# Patient Record
Sex: FEMALE | Employment: OTHER | URBAN - METROPOLITAN AREA
[De-identification: names, ages, dates, MRNs, and addresses within clinical notes are randomized per-mention and may not be internally consistent; named-entity substitution may affect disease eponyms.]

---

## 2024-07-12 NOTE — PROGRESS NOTES
2/17/2023 [place] L discharge note:  Dion Tyson is a 62 y.o. female with a history of Alcoholism, Hep-C, Bipolar Disorder, Asthma who presented to [facility] 2/10/23 with alcohol withdrawal after attempting to self-taper down and unwitnessed fall at home. Hospital course complicated by severe ETOH withdrawal and anxiety disorder. And further prolonged by patient declining discharge and filing appeal for discharge.      Acute Toxic Encephalopathy: due to alcohol withdrawal delirium with underlying bipolar disorder. Behavioral medicine followed.   Severe Alcoholism: with abuse, dependence, severe withdrawal. Drinks 24 beers/day, last use ~2/9/23. S/p Phenobarbital taper - completed 2/14/23. Reported ongoing tremors, Propranolol started 2/15/23. Plan for alcohol treatment center on discharge.   Left Wrist Pain: with unwitnessed fall at home prior to admit. Hand-surgery evaluated 2/13/23, XR with OA, no fracture. Supportive care.   Bipolar-1 Disorder: Follows with Dr. Sim (Psychiatrist) Continued home Lamictal, Topamax, Seroquel, Trazodone, Added PRN atarax for breakthrough anxiety.  followed.   Chronic Hepatitis-C: No treatment to date, RUQ U/S 2/12/23: no overt cirrhosis, did have small right hepatic lobe nodule. MRI-Liver 2/12/23 ordered - can be completed outpatient.   Chronic Cervical Spine DDD: on disability due to neuropathy and weakness. CT Cspine 2/10/23 with degenerative changes resulting in severe spinal stenosis at C5-C7. MRI Cervical Spine 2/12/23 ordered - can complete outpatient.   Asthma: Hx, no exacerbation, prn inhaler continued.  Moderate Malnutrition: POA. By ASPEN criteria. RD followed  Code status: Full  DVT Prophylaxis: lovenox.     Current living situation: Home with boyfriend in [facility]  Expected Disposition: Requesting EtOH treatment facility  Estimated discharge date: has been medically ready since 2/14/23, declined discharge on 2/15 and 2/16 due to not wanting to go to treatment  facility arranged by .   Plan for DC 2/26/23 pending medicare appeal       History of Present Complaint:  The patient was referred to us by Referring Provider: ***. this is 64 y.o.  female {Accompanied by:14647}with a past history of {kt past medical history:07635} presenting with {kt mrdica symptoms:19052}    Pain started {pain onset:36679}  Pain is {Better or worse:89278}   Patient history significant for the following red flags: {Red flags:09366}  The pain is described as {Pain description:48126} and is relieved by {pain relief:11040}      Prior Pain Therapies: {Prior pain therapy:88430}    Past surgical history:  {Past surgical history:27038}           Procedures:   *** the patient has had a ***% improvement in pain.    Portions of record reviewed for pertinent issues: active problem list, medication list, allergies, family history, social history, notes from last encounter, encounters, lab results, imaging and other available records.    I have personally reviewed the OARRS report for this patient. This report is scanned into the electronic medical record. I have considered the risks of abuse, dependence, addiction and diversion. It showed: Ultrex on 50 mg and sporadic hydrocodone prior to that Vivitrol  OPIOID RISK ASSESSMENT SCORE ***/26  Opioid agreement: ***  Activities of daily living: ***  Adverse effects: ***  Analgesia: W/O ***/10, W ***/10  {***}  Toxicology screen: ***  Aberrant behavior: ***      Diagnostic studies:  ***      Employment/disability/litigation: {ktlitigation:92727}    Social History:  {KT social history:08912}       Review of Systems       Physical Exam       Assessment  ***           Plan  At least 50% of the visit was involved in the discussion of the options for treatment. We discussed exercises, medication, interventional therapies and surgery. Healthy life style is essential with patient hard work to achieve the wellness. In addition; discussion with the patient  and/or family about any of the diagnostic results, impressions and/or recommended diagnostic studies, prognosis, risks and benefits of treatment options, instructions for treatment and/or follow-up, importance of compliance with chosen treatment options, risk-factor reduction, and patient/family education.         Pool therapy, walking in the pool, at least 3x per week for 30 minutes  *** Smoking cessation  Healthy lifestyle and anti-inflammatory diet in addition to weight control discussed with the patient  Alternative chronic pain therapies was discussed, encouraged and information was handed  Return to Clinic 3 months       *Please note this report has been produced using speech recognition software and may contain errors related to that system including grammar, punctuation and spelling as well as words and phrases that may be inappropriate. If there are questions or concerns, please feel free to contact me to clarify.    Tresa Maxwell MD

## 2024-07-18 ENCOUNTER — APPOINTMENT (OUTPATIENT)
Dept: PAIN MEDICINE | Facility: CLINIC | Age: 64
End: 2024-07-18
Payer: MEDICARE

## 2024-08-28 ENCOUNTER — HOSPITAL ENCOUNTER (OUTPATIENT)
Facility: HOSPITAL | Age: 64
Setting detail: OBSERVATION
Discharge: HOME | End: 2024-08-29
Attending: EMERGENCY MEDICINE | Admitting: INTERNAL MEDICINE
Payer: MEDICARE

## 2024-08-28 ENCOUNTER — APPOINTMENT (OUTPATIENT)
Dept: RADIOLOGY | Facility: HOSPITAL | Age: 64
End: 2024-08-28
Payer: MEDICARE

## 2024-08-28 ENCOUNTER — APPOINTMENT (OUTPATIENT)
Dept: CARDIOLOGY | Facility: HOSPITAL | Age: 64
End: 2024-08-28
Payer: MEDICARE

## 2024-08-28 DIAGNOSIS — J44.1 COPD EXACERBATION (MULTI): Primary | ICD-10-CM

## 2024-08-28 DIAGNOSIS — R06.02 SHORTNESS OF BREATH: ICD-10-CM

## 2024-08-28 LAB
ALBUMIN SERPL BCP-MCNC: 4.3 G/DL (ref 3.4–5)
ALP SERPL-CCNC: 73 U/L (ref 33–136)
ALT SERPL W P-5'-P-CCNC: 28 U/L (ref 7–45)
ANION GAP BLDV CALCULATED.4IONS-SCNC: 10 MMOL/L (ref 10–25)
ANION GAP SERPL CALC-SCNC: 13 MMOL/L (ref 10–20)
AST SERPL W P-5'-P-CCNC: 23 U/L (ref 9–39)
BASE EXCESS BLDV CALC-SCNC: -1.1 MMOL/L (ref -2–3)
BASOPHILS # BLD AUTO: 0.01 X10*3/UL (ref 0–0.1)
BASOPHILS NFR BLD AUTO: 0.1 %
BILIRUB SERPL-MCNC: 0.3 MG/DL (ref 0–1.2)
BNP SERPL-MCNC: 104 PG/ML (ref 0–99)
BODY TEMPERATURE: 37 DEGREES CELSIUS
BUN SERPL-MCNC: 20 MG/DL (ref 6–23)
CA-I BLDV-SCNC: 1.12 MMOL/L (ref 1.1–1.33)
CALCIUM SERPL-MCNC: 9.7 MG/DL (ref 8.6–10.3)
CARDIAC TROPONIN I PNL SERPL HS: 6 NG/L (ref 0–13)
CARDIAC TROPONIN I PNL SERPL HS: 6 NG/L (ref 0–13)
CHLORIDE BLDV-SCNC: 111 MMOL/L (ref 98–107)
CHLORIDE SERPL-SCNC: 107 MMOL/L (ref 98–107)
CO2 SERPL-SCNC: 23 MMOL/L (ref 21–32)
CREAT SERPL-MCNC: 0.92 MG/DL (ref 0.5–1.05)
EGFRCR SERPLBLD CKD-EPI 2021: 69 ML/MIN/1.73M*2
EOSINOPHIL # BLD AUTO: 0.01 X10*3/UL (ref 0–0.7)
EOSINOPHIL NFR BLD AUTO: 0.1 %
ERYTHROCYTE [DISTWIDTH] IN BLOOD BY AUTOMATED COUNT: 14.8 % (ref 11.5–14.5)
FLUAV RNA RESP QL NAA+PROBE: NOT DETECTED
FLUBV RNA RESP QL NAA+PROBE: NOT DETECTED
GLUCOSE BLDV-MCNC: 92 MG/DL (ref 74–99)
GLUCOSE SERPL-MCNC: 97 MG/DL (ref 74–99)
HCO3 BLDV-SCNC: 22.4 MMOL/L (ref 22–26)
HCT VFR BLD AUTO: 42.1 % (ref 36–46)
HCT VFR BLD EST: 37 % (ref 36–46)
HGB BLD-MCNC: 14.5 G/DL (ref 12–16)
HGB BLDV-MCNC: 12.4 G/DL (ref 12–16)
IMM GRANULOCYTES # BLD AUTO: 0.04 X10*3/UL (ref 0–0.7)
IMM GRANULOCYTES NFR BLD AUTO: 0.3 % (ref 0–0.9)
INHALED O2 CONCENTRATION: 21 %
LACTATE BLDV-SCNC: 1 MMOL/L (ref 0.4–2)
LYMPHOCYTES # BLD AUTO: 2.75 X10*3/UL (ref 1.2–4.8)
LYMPHOCYTES NFR BLD AUTO: 23.4 %
MAGNESIUM SERPL-MCNC: 1.86 MG/DL (ref 1.6–2.4)
MCH RBC QN AUTO: 34.6 PG (ref 26–34)
MCHC RBC AUTO-ENTMCNC: 34.4 G/DL (ref 32–36)
MCV RBC AUTO: 101 FL (ref 80–100)
MONOCYTES # BLD AUTO: 1.11 X10*3/UL (ref 0.1–1)
MONOCYTES NFR BLD AUTO: 9.5 %
NEUTROPHILS # BLD AUTO: 7.81 X10*3/UL (ref 1.2–7.7)
NEUTROPHILS NFR BLD AUTO: 66.6 %
NRBC BLD-RTO: 0 /100 WBCS (ref 0–0)
OXYHGB MFR BLDV: 76 % (ref 45–75)
PCO2 BLDV: 33 MM HG (ref 41–51)
PH BLDV: 7.44 PH (ref 7.33–7.43)
PLATELET # BLD AUTO: 220 X10*3/UL (ref 150–450)
PO2 BLDV: 52 MM HG (ref 35–45)
POTASSIUM BLDV-SCNC: 4 MMOL/L (ref 3.5–5.3)
POTASSIUM SERPL-SCNC: 3.9 MMOL/L (ref 3.5–5.3)
PROT SERPL-MCNC: 6.7 G/DL (ref 6.4–8.2)
RBC # BLD AUTO: 4.19 X10*6/UL (ref 4–5.2)
SAO2 % BLDV: 82 % (ref 45–75)
SARS-COV-2 RNA RESP QL NAA+PROBE: NOT DETECTED
SODIUM BLDV-SCNC: 139 MMOL/L (ref 136–145)
SODIUM SERPL-SCNC: 139 MMOL/L (ref 136–145)
WBC # BLD AUTO: 11.7 X10*3/UL (ref 4.4–11.3)

## 2024-08-28 PROCEDURE — 36415 COLL VENOUS BLD VENIPUNCTURE: CPT | Performed by: NURSE PRACTITIONER

## 2024-08-28 PROCEDURE — 84484 ASSAY OF TROPONIN QUANT: CPT | Performed by: NURSE PRACTITIONER

## 2024-08-28 PROCEDURE — 2500000004 HC RX 250 GENERAL PHARMACY W/ HCPCS (ALT 636 FOR OP/ED): Performed by: EMERGENCY MEDICINE

## 2024-08-28 PROCEDURE — 2500000004 HC RX 250 GENERAL PHARMACY W/ HCPCS (ALT 636 FOR OP/ED): Performed by: INTERNAL MEDICINE

## 2024-08-28 PROCEDURE — G0378 HOSPITAL OBSERVATION PER HR: HCPCS

## 2024-08-28 PROCEDURE — 80053 COMPREHEN METABOLIC PANEL: CPT | Performed by: NURSE PRACTITIONER

## 2024-08-28 PROCEDURE — 2500000001 HC RX 250 WO HCPCS SELF ADMINISTERED DRUGS (ALT 637 FOR MEDICARE OP): Performed by: INTERNAL MEDICINE

## 2024-08-28 PROCEDURE — 82435 ASSAY OF BLOOD CHLORIDE: CPT | Performed by: NURSE PRACTITIONER

## 2024-08-28 PROCEDURE — 83735 ASSAY OF MAGNESIUM: CPT | Performed by: NURSE PRACTITIONER

## 2024-08-28 PROCEDURE — 99285 EMERGENCY DEPT VISIT HI MDM: CPT

## 2024-08-28 PROCEDURE — 96365 THER/PROPH/DIAG IV INF INIT: CPT | Mod: 59

## 2024-08-28 PROCEDURE — 96372 THER/PROPH/DIAG INJ SC/IM: CPT | Performed by: INTERNAL MEDICINE

## 2024-08-28 PROCEDURE — 99223 1ST HOSP IP/OBS HIGH 75: CPT | Performed by: INTERNAL MEDICINE

## 2024-08-28 PROCEDURE — 94640 AIRWAY INHALATION TREATMENT: CPT

## 2024-08-28 PROCEDURE — 85025 COMPLETE CBC W/AUTO DIFF WBC: CPT | Performed by: NURSE PRACTITIONER

## 2024-08-28 PROCEDURE — 2500000004 HC RX 250 GENERAL PHARMACY W/ HCPCS (ALT 636 FOR OP/ED): Performed by: NURSE PRACTITIONER

## 2024-08-28 PROCEDURE — 71046 X-RAY EXAM CHEST 2 VIEWS: CPT

## 2024-08-28 PROCEDURE — 93005 ELECTROCARDIOGRAM TRACING: CPT

## 2024-08-28 PROCEDURE — 2500000002 HC RX 250 W HCPCS SELF ADMINISTERED DRUGS (ALT 637 FOR MEDICARE OP, ALT 636 FOR OP/ED): Performed by: NURSE PRACTITIONER

## 2024-08-28 PROCEDURE — 71046 X-RAY EXAM CHEST 2 VIEWS: CPT | Performed by: RADIOLOGY

## 2024-08-28 PROCEDURE — 83880 ASSAY OF NATRIURETIC PEPTIDE: CPT | Performed by: NURSE PRACTITIONER

## 2024-08-28 PROCEDURE — 87636 SARSCOV2 & INF A&B AMP PRB: CPT | Performed by: NURSE PRACTITIONER

## 2024-08-28 PROCEDURE — 2500000002 HC RX 250 W HCPCS SELF ADMINISTERED DRUGS (ALT 637 FOR MEDICARE OP, ALT 636 FOR OP/ED): Performed by: EMERGENCY MEDICINE

## 2024-08-28 PROCEDURE — 96375 TX/PRO/DX INJ NEW DRUG ADDON: CPT

## 2024-08-28 RX ORDER — LAMOTRIGINE 100 MG/1
300 TABLET ORAL DAILY
COMMUNITY
End: 2024-08-29 | Stop reason: ENTERED-IN-ERROR

## 2024-08-28 RX ORDER — TOPIRAMATE 100 MG/1
100 TABLET, FILM COATED ORAL DAILY
Status: DISCONTINUED | OUTPATIENT
Start: 2024-08-28 | End: 2024-08-29 | Stop reason: HOSPADM

## 2024-08-28 RX ORDER — PANTOPRAZOLE SODIUM 40 MG/1
40 TABLET, DELAYED RELEASE ORAL 2 TIMES DAILY
COMMUNITY

## 2024-08-28 RX ORDER — IPRATROPIUM BROMIDE AND ALBUTEROL SULFATE 2.5; .5 MG/3ML; MG/3ML
3 SOLUTION RESPIRATORY (INHALATION)
Status: DISCONTINUED | OUTPATIENT
Start: 2024-08-29 | End: 2024-08-28

## 2024-08-28 RX ORDER — ONDANSETRON HYDROCHLORIDE 2 MG/ML
4 INJECTION, SOLUTION INTRAVENOUS EVERY 8 HOURS PRN
Status: DISCONTINUED | OUTPATIENT
Start: 2024-08-28 | End: 2024-08-29 | Stop reason: HOSPADM

## 2024-08-28 RX ORDER — IPRATROPIUM BROMIDE AND ALBUTEROL SULFATE 2.5; .5 MG/3ML; MG/3ML
3 SOLUTION RESPIRATORY (INHALATION)
Status: DISCONTINUED | OUTPATIENT
Start: 2024-08-29 | End: 2024-08-29 | Stop reason: HOSPADM

## 2024-08-28 RX ORDER — ALBUTEROL SULFATE 0.83 MG/ML
2.5 SOLUTION RESPIRATORY (INHALATION) EVERY 2 HOUR PRN
Status: DISCONTINUED | OUTPATIENT
Start: 2024-08-28 | End: 2024-08-29 | Stop reason: HOSPADM

## 2024-08-28 RX ORDER — BUSPIRONE HYDROCHLORIDE 10 MG/1
20 TABLET ORAL DAILY
Status: DISCONTINUED | OUTPATIENT
Start: 2024-08-29 | End: 2024-08-29 | Stop reason: HOSPADM

## 2024-08-28 RX ORDER — IBUPROFEN 800 MG/1
800 TABLET ORAL EVERY 8 HOURS PRN
COMMUNITY

## 2024-08-28 RX ORDER — LAMOTRIGINE 100 MG/1
300 TABLET ORAL DAILY
Status: DISCONTINUED | OUTPATIENT
Start: 2024-08-29 | End: 2024-08-28

## 2024-08-28 RX ORDER — IBUPROFEN 800 MG/1
800 TABLET ORAL 4 TIMES DAILY
Status: DISCONTINUED | OUTPATIENT
Start: 2024-08-28 | End: 2024-08-29 | Stop reason: HOSPADM

## 2024-08-28 RX ORDER — MORPHINE SULFATE 4 MG/ML
4 INJECTION, SOLUTION INTRAMUSCULAR; INTRAVENOUS ONCE
Status: COMPLETED | OUTPATIENT
Start: 2024-08-28 | End: 2024-08-28

## 2024-08-28 RX ORDER — TOPIRAMATE 100 MG/1
100 TABLET, FILM COATED ORAL DAILY
Status: DISCONTINUED | OUTPATIENT
Start: 2024-08-29 | End: 2024-08-28

## 2024-08-28 RX ORDER — TRAZODONE HYDROCHLORIDE 50 MG/1
200 TABLET ORAL DAILY
Status: DISCONTINUED | OUTPATIENT
Start: 2024-08-29 | End: 2024-08-28

## 2024-08-28 RX ORDER — ROPINIROLE HYDROCHLORIDE 2 MG/1
2 TABLET, FILM COATED, EXTENDED RELEASE ORAL DAILY
Status: DISCONTINUED | OUTPATIENT
Start: 2024-08-29 | End: 2024-08-28

## 2024-08-28 RX ORDER — PANTOPRAZOLE SODIUM 40 MG/1
40 TABLET, DELAYED RELEASE ORAL
Status: DISCONTINUED | OUTPATIENT
Start: 2024-08-29 | End: 2024-08-29 | Stop reason: HOSPADM

## 2024-08-28 RX ORDER — IPRATROPIUM BROMIDE AND ALBUTEROL SULFATE 2.5; .5 MG/3ML; MG/3ML
3 SOLUTION RESPIRATORY (INHALATION) ONCE
Status: COMPLETED | OUTPATIENT
Start: 2024-08-28 | End: 2024-08-28

## 2024-08-28 RX ORDER — TRAZODONE HYDROCHLORIDE 100 MG/1
200 TABLET ORAL DAILY
COMMUNITY
End: 2024-08-29 | Stop reason: ENTERED-IN-ERROR

## 2024-08-28 RX ORDER — BUSPIRONE HYDROCHLORIDE 5 MG/1
1 TABLET ORAL 3 TIMES DAILY
COMMUNITY

## 2024-08-28 RX ORDER — LAMOTRIGINE 100 MG/1
300 TABLET ORAL DAILY
Status: DISCONTINUED | OUTPATIENT
Start: 2024-08-28 | End: 2024-08-29 | Stop reason: HOSPADM

## 2024-08-28 RX ORDER — ROPINIROLE 1 MG/1
4 TABLET, FILM COATED ORAL NIGHTLY
COMMUNITY
End: 2024-08-29 | Stop reason: ENTERED-IN-ERROR

## 2024-08-28 RX ORDER — GABAPENTIN 300 MG/1
300 CAPSULE ORAL 3 TIMES DAILY
Status: DISCONTINUED | OUTPATIENT
Start: 2024-08-28 | End: 2024-08-29 | Stop reason: HOSPADM

## 2024-08-28 RX ORDER — TOPIRAMATE 100 MG/1
100 TABLET, FILM COATED ORAL DAILY
COMMUNITY

## 2024-08-28 RX ORDER — GUAIFENESIN/DEXTROMETHORPHAN 100-10MG/5
5 SYRUP ORAL EVERY 4 HOURS PRN
Status: DISCONTINUED | OUTPATIENT
Start: 2024-08-28 | End: 2024-08-29 | Stop reason: HOSPADM

## 2024-08-28 RX ORDER — AZITHROMYCIN 250 MG/1
250 TABLET, FILM COATED ORAL
Status: DISCONTINUED | OUTPATIENT
Start: 2024-08-29 | End: 2024-08-29 | Stop reason: HOSPADM

## 2024-08-28 RX ORDER — IBUPROFEN 200 MG
1 TABLET ORAL DAILY
Status: DISCONTINUED | OUTPATIENT
Start: 2024-08-29 | End: 2024-08-29 | Stop reason: HOSPADM

## 2024-08-28 RX ORDER — TRAZODONE HYDROCHLORIDE 50 MG/1
200 TABLET ORAL DAILY
Status: DISCONTINUED | OUTPATIENT
Start: 2024-08-28 | End: 2024-08-29 | Stop reason: HOSPADM

## 2024-08-28 RX ORDER — ACETAMINOPHEN 325 MG/1
650 TABLET ORAL EVERY 4 HOURS PRN
Status: DISCONTINUED | OUTPATIENT
Start: 2024-08-28 | End: 2024-08-29 | Stop reason: HOSPADM

## 2024-08-28 RX ORDER — GABAPENTIN 300 MG/1
300 CAPSULE ORAL 3 TIMES DAILY
COMMUNITY

## 2024-08-28 RX ORDER — ATORVASTATIN CALCIUM 40 MG/1
40 TABLET, FILM COATED ORAL DAILY
Status: DISCONTINUED | OUTPATIENT
Start: 2024-08-29 | End: 2024-08-29 | Stop reason: HOSPADM

## 2024-08-28 RX ORDER — ROPINIROLE 2 MG/1
4 TABLET, FILM COATED ORAL NIGHTLY
Status: DISCONTINUED | OUTPATIENT
Start: 2024-08-28 | End: 2024-08-29 | Stop reason: HOSPADM

## 2024-08-28 RX ORDER — ENOXAPARIN SODIUM 100 MG/ML
40 INJECTION SUBCUTANEOUS EVERY 24 HOURS
Status: DISCONTINUED | OUTPATIENT
Start: 2024-08-28 | End: 2024-08-29 | Stop reason: HOSPADM

## 2024-08-28 RX ORDER — ROPINIROLE HYDROCHLORIDE 2 MG/1
2 TABLET, FILM COATED, EXTENDED RELEASE ORAL DAILY
COMMUNITY
End: 2024-08-28 | Stop reason: ENTERED-IN-ERROR

## 2024-08-28 RX ORDER — ALBUTEROL SULFATE 0.83 MG/ML
5 SOLUTION RESPIRATORY (INHALATION) ONCE
Status: COMPLETED | OUTPATIENT
Start: 2024-08-28 | End: 2024-08-28

## 2024-08-28 RX ORDER — QUETIAPINE FUMARATE 100 MG/1
100 TABLET, FILM COATED ORAL EVERY MORNING
COMMUNITY

## 2024-08-28 RX ORDER — ATORVASTATIN CALCIUM 40 MG/1
40 TABLET, FILM COATED ORAL DAILY
COMMUNITY

## 2024-08-28 ASSESSMENT — COLUMBIA-SUICIDE SEVERITY RATING SCALE - C-SSRS
6. HAVE YOU EVER DONE ANYTHING, STARTED TO DO ANYTHING, OR PREPARED TO DO ANYTHING TO END YOUR LIFE?: NO
2. HAVE YOU ACTUALLY HAD ANY THOUGHTS OF KILLING YOURSELF?: NO
1. IN THE PAST MONTH, HAVE YOU WISHED YOU WERE DEAD OR WISHED YOU COULD GO TO SLEEP AND NOT WAKE UP?: NO

## 2024-08-28 ASSESSMENT — PAIN - FUNCTIONAL ASSESSMENT
PAIN_FUNCTIONAL_ASSESSMENT: 0-10
PAIN_FUNCTIONAL_ASSESSMENT: 0-10

## 2024-08-28 ASSESSMENT — PAIN SCALES - GENERAL
PAINLEVEL_OUTOF10: 6
PAINLEVEL_OUTOF10: 9
PAINLEVEL_OUTOF10: 0 - NO PAIN

## 2024-08-29 VITALS
DIASTOLIC BLOOD PRESSURE: 68 MMHG | WEIGHT: 131 LBS | TEMPERATURE: 98.1 F | OXYGEN SATURATION: 95 % | HEART RATE: 78 BPM | HEIGHT: 60 IN | SYSTOLIC BLOOD PRESSURE: 107 MMHG | RESPIRATION RATE: 16 BRPM | BODY MASS INDEX: 25.72 KG/M2

## 2024-08-29 PROBLEM — F31.9 BIPOLAR 1 DISORDER (MULTI): Status: ACTIVE | Noted: 2023-02-11

## 2024-08-29 PROBLEM — E87.6 HYPOKALEMIA: Status: ACTIVE | Noted: 2023-02-10

## 2024-08-29 PROBLEM — E63.9 NUTRITIONAL DEFICIENCY: Status: ACTIVE | Noted: 2024-08-29

## 2024-08-29 PROBLEM — Q75.9 SKULL ASYMMETRY: Status: ACTIVE | Noted: 2024-08-29

## 2024-08-29 PROBLEM — R05.9 COUGH: Status: ACTIVE | Noted: 2024-08-27

## 2024-08-29 PROBLEM — M46.1 SACROILIITIS (CMS-HCC): Status: ACTIVE | Noted: 2024-08-29

## 2024-08-29 PROBLEM — J98.8: Status: ACTIVE | Noted: 2024-08-29

## 2024-08-29 PROBLEM — D10.0: Status: ACTIVE | Noted: 2024-08-29
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
## 2024-08-29 PROBLEM — F10.939 ALCOHOL WITHDRAWAL SYNDROME (MULTI): Status: ACTIVE | Noted: 2023-02-10

## 2024-08-29 PROBLEM — L60.0 INGROWN TOENAIL: Status: ACTIVE | Noted: 2018-04-25

## 2024-08-29 PROBLEM — G93.40 ENCEPHALOPATHY: Status: ACTIVE | Noted: 2023-02-11

## 2024-08-29 PROBLEM — M19.079 OSTEOARTHRITIS OF ANKLE OR FOOT: Status: ACTIVE | Noted: 2018-04-25

## 2024-08-29 PROBLEM — G47.00 INSOMNIA: Status: ACTIVE | Noted: 2024-08-29

## 2024-08-29 PROBLEM — F31.62 MODERATE MIXED BIPOLAR I DISORDER (MULTI): Status: ACTIVE | Noted: 2024-08-29

## 2024-08-29 PROBLEM — F43.23 ADJUSTMENT DISORDER WITH MIXED ANXIETY AND DEPRESSED MOOD: Status: ACTIVE | Noted: 2024-08-29

## 2024-08-29 PROBLEM — R68.89 FORGETFULNESS: Status: ACTIVE | Noted: 2024-08-29

## 2024-08-29 PROBLEM — Z86.59 HISTORY OF ALCOHOL WITHDRAWAL DELIRIUM: Status: ACTIVE | Noted: 2023-02-10

## 2024-08-29 PROBLEM — M77.8 EXTENSOR TENDONITIS OF FOOT: Status: ACTIVE | Noted: 2019-10-24

## 2024-08-29 PROBLEM — G60.9 IDIOPATHIC NEUROPATHY: Status: ACTIVE | Noted: 2017-10-03

## 2024-08-29 PROBLEM — J40 BRONCHITIS: Status: ACTIVE | Noted: 2024-08-29

## 2024-08-29 PROBLEM — E53.8 B12 DEFICIENCY: Status: ACTIVE | Noted: 2024-08-29

## 2024-08-29 PROBLEM — Z86.19 HEPATITIS C VIRUS INFECTION CURED AFTER ANTIVIRAL DRUG THERAPY: Status: ACTIVE | Noted: 2024-08-29

## 2024-08-29 PROBLEM — E55.9 VITAMIN D DEFICIENCY: Status: ACTIVE | Noted: 2024-08-29

## 2024-08-29 PROBLEM — M21.622 TAILOR'S BUNION OF LEFT FOOT: Status: ACTIVE | Noted: 2020-05-19

## 2024-08-29 PROBLEM — J44.1 COPD EXACERBATION (MULTI): Status: RESOLVED | Noted: 2024-08-28 | Resolved: 2024-08-29

## 2024-08-29 PROBLEM — G89.4 CHRONIC PAIN SYNDROME: Status: ACTIVE | Noted: 2024-08-29

## 2024-08-29 PROBLEM — K21.9 GERD (GASTROESOPHAGEAL REFLUX DISEASE): Status: ACTIVE | Noted: 2024-08-29

## 2024-08-29 LAB
ATRIAL RATE: 70 BPM
P AXIS: 41 DEGREES
P OFFSET: 192 MS
P ONSET: 145 MS
PR INTERVAL: 154 MS
Q ONSET: 222 MS
QRS COUNT: 11 BEATS
QRS DURATION: 74 MS
QT INTERVAL: 368 MS
QTC CALCULATION(BAZETT): 397 MS
QTC FREDERICIA: 387 MS
R AXIS: 56 DEGREES
T AXIS: 61 DEGREES
T OFFSET: 406 MS
VENTRICULAR RATE: 70 BPM

## 2024-08-29 PROCEDURE — 2500000001 HC RX 250 WO HCPCS SELF ADMINISTERED DRUGS (ALT 637 FOR MEDICARE OP): Performed by: INTERNAL MEDICINE

## 2024-08-29 PROCEDURE — 99239 HOSP IP/OBS DSCHRG MGMT >30: CPT | Performed by: INTERNAL MEDICINE

## 2024-08-29 PROCEDURE — 2500000002 HC RX 250 W HCPCS SELF ADMINISTERED DRUGS (ALT 637 FOR MEDICARE OP, ALT 636 FOR OP/ED): Performed by: INTERNAL MEDICINE

## 2024-08-29 PROCEDURE — 2500000004 HC RX 250 GENERAL PHARMACY W/ HCPCS (ALT 636 FOR OP/ED): Performed by: INTERNAL MEDICINE

## 2024-08-29 PROCEDURE — 96376 TX/PRO/DX INJ SAME DRUG ADON: CPT

## 2024-08-29 PROCEDURE — 94640 AIRWAY INHALATION TREATMENT: CPT

## 2024-08-29 PROCEDURE — G0378 HOSPITAL OBSERVATION PER HR: HCPCS

## 2024-08-29 PROCEDURE — S4991 NICOTINE PATCH NONLEGEND: HCPCS | Performed by: INTERNAL MEDICINE

## 2024-08-29 PROCEDURE — 2500000002 HC RX 250 W HCPCS SELF ADMINISTERED DRUGS (ALT 637 FOR MEDICARE OP, ALT 636 FOR OP/ED): Performed by: EMERGENCY MEDICINE

## 2024-08-29 RX ORDER — QUETIAPINE FUMARATE 100 MG/1
200 TABLET, FILM COATED ORAL NIGHTLY
COMMUNITY

## 2024-08-29 RX ORDER — ROPINIROLE 2 MG/1
2 TABLET, FILM COATED ORAL NIGHTLY
COMMUNITY

## 2024-08-29 RX ORDER — TRAZODONE HYDROCHLORIDE 50 MG/1
150 TABLET ORAL NIGHTLY
COMMUNITY

## 2024-08-29 RX ORDER — LAMOTRIGINE 150 MG/1
150 TABLET ORAL 2 TIMES DAILY
COMMUNITY

## 2024-08-29 RX ORDER — ACETAMINOPHEN 500 MG
5 TABLET ORAL NIGHTLY PRN
Status: DISCONTINUED | OUTPATIENT
Start: 2024-08-29 | End: 2024-08-29 | Stop reason: HOSPADM

## 2024-08-29 ASSESSMENT — PAIN - FUNCTIONAL ASSESSMENT: PAIN_FUNCTIONAL_ASSESSMENT: 0-10

## 2024-08-29 ASSESSMENT — PAIN SCALES - GENERAL: PAINLEVEL_OUTOF10: 7

## 2024-08-29 NOTE — PROGRESS NOTES
Pharmacy Medication History Review    Dion Tyson is a 64 y.o. female admitted for COPD exacerbation ([facility]). Pharmacy reviewed the patient's darjy-[id number] medications and allergies for accuracy.    The list below reflectives the updated PTA list. Please review each medication in order reconciliation for additional clarification and justification.  Prior to Admission medications    Medication Sig Start Date End Date Taking? Authorizing Provider   atorvastatin (Lipitor) 40 mg tablet Take 1 tablet (40 mg) by mouth once daily.    Historical Provider, MD   busPIRone (Buspar) 5 mg tablet Take 4 tablets (20 mg) by mouth once daily.    Historical Provider, MD   gabapentin (Neurontin) 300 mg capsule Take 1 capsule (300 mg) by mouth 3 times a day.    Historical Provider, MD   ibuprofen 800 mg tablet Take 1 tablet (800 mg) by mouth 4 times a day.    Historical Provider, MD   lamoTRIgine (LaMICtal) 100 mg tablet Take 3 tablets (300 mg) by mouth once daily.    Historical Provider, MD   pantoprazole (ProtoNix) 40 mg EC tablet Take 1 tablet (40 mg) by mouth once daily in the morning. Take before meals. Do not crush, chew, or split.    Historical Provider, MD   QUEtiapine (SEROquel) 100 mg tablet Take 3 tablets (300 mg) by mouth once daily.    Historical Provider, MD   rOPINIRole 2 mg  Take 2 tablet (4 mg) by mouth once daily.    Historical Provider, MD   topiramate (Topamax) 100 mg tablet Take 1 tablet (100 mg) by mouth once daily.    Historical Provider, MD   traZODone (Desyrel) 100 mg tablet Take 2 tablets (200 mg) by mouth once daily.    Historical Provider, MD        The list below reflectives the updated allergy list. Please review each documented allergy for additional clarification and justification.  Allergies  Reviewed by Peggy Johnston RN on 8/28/2024   No Known Allergies         Below are additional concerns with the patient's PTA list.  N/a    Cassy Kerr, AshliD

## 2024-08-29 NOTE — DISCHARGE SUMMARY
Discharge Diagnosis  COPD exacerbation (Multi)    Issues Requiring Follow-Up      Discharge Meds     Your medication list        CONTINUE taking these medications        Instructions Last Dose Given Next Dose Due   atorvastatin 40 mg tablet  Commonly known as: Lipitor           busPIRone 5 mg tablet  Commonly known as: Buspar           gabapentin 300 mg capsule  Commonly known as: Neurontin           ibuprofen 800 mg tablet           lamoTRIgine 100 mg tablet  Commonly known as: LaMICtal           pantoprazole 40 mg EC tablet  Commonly known as: ProtoNix           QUEtiapine 100 mg tablet  Commonly known as: SEROquel           rOPINIRole 1 mg tablet  Commonly known as: Requip           topiramate 100 mg tablet  Commonly known as: Topamax           traZODone 100 mg tablet  Commonly known as: Desyrel                    Test Results Pending At Discharge  Pending Labs       No current pending labs.            Hospital Course  Dion Tyson is a 64 y.o. female who presents to [facility] with complaints of shortness of breath and cough.  Symptoms started 2 days ago, she is white and saw her PCP yesterday and was started on prednisone.  Her breathing is not improved, she continues to cough and this makes it very hard for her to breathe.  Denies any fevers or chills.  Patient admits to still smoking about a pack a day, she has been doing this for over 50 years.  In the ED she has increased work of breathing, however she is not requiring any oxygen.  Chest x-ray negative for infiltrates or effusions.    Patient received steroids, bronchodilators and mucolytics and overall improved rapidly.   She remained off of O2 with sats WNL.   She is requesting discharge home as she is feeling back to baseline.   Encouraged smoking cessation and close follow up with her PCP/Pulmonologist.  She is agreeable to this plan.    Patient states that she has a steroid taper that she just filled by her PCP and will  cont that.     Discharge time spent 35 minutes.      Pertinent Physical Exam At Time of Discharge    GENERAL:   no distress, alert and cooperative  HEENT: Normal Inspection, Mucous membranes moist, No JVD, No Lymphadenopathy  CARDIOVASCULAR: RRR, no murmurs, 2+ equal pulses of the extremities, normal S1 and S 2  RESPIRATORY: Patent airways, slightly diminished but no significant wheezing.   ABDOMEN: Soft, Non-Tender, Normal Bowel Sounds, No Distention  SKIN: Warm and dry, no lesions, no rashes  EXTREMITIES: normal extremities, no cyanosis edema, contusions or wounds, no clubbing  NEURO: A&O x 3, CN II-XII grossly intact  PSYCH: Appropriate mood and behavior      Outpatient Follow-Up  No future appointments.      Sarmad Castorena, DO

## 2024-08-29 NOTE — HOSPITAL COURSE
Dion Tyson is a 64 y.o. female who presents to [facility] with complaints of shortness of breath and cough.  Symptoms started 2 days ago, she is white and saw her PCP yesterday and was started on prednisone.  Her breathing is not improved, she continues to cough and this makes it very hard for her to breathe.  Denies any fevers or chills.  Patient admits to still smoking about a pack a day, she has been doing this for over 50 years.  In the ED she has increased work of breathing, however she is not requiring any oxygen.  Chest x-ray negative for infiltrates or effusions.    Patient received steroids, bronchodilators and mucolytics and overall improved rapidly.   She remained off of O2 with sats WNL.   She is requesting discharge home as she is feeling back to baseline.   Encouraged smoking cessation and close follow up with her PCP/Pulmonologist.  She is agreeable to this plan.    Patient states that she has a steroid taper that she just filled by her PCP and will cont that.     Discharge time spent 35 minutes.

## 2024-08-29 NOTE — ED NOTES
Updated with admission process,  relief after aerosol tx, no c/o at this time.     Kaykay Tomas RN  08/28/24 [id number]

## 2024-08-29 NOTE — ED NOTES
PT up to bathroom, gait steady- relief after 2nd aerosol tx. Awaiting ready bed.     Kaykay Tomas RN  08/28/24 2870

## 2024-08-29 NOTE — ED PROVIDER NOTES
HPI   Chief Complaint   Patient presents with    Shortness of Breath     Pt worsening SOB for 2 days. Seen by PCP yesterday with outpatient xrays this morning. Pt has nonproductive cough with increased SOB on exertion. Not onhoome O2. No CP. Denies fevers. Pt A/O4 from home. Walks with steady gait       Patient is a nontoxic-appearing 64-year-old female with a past medical history of COPD, alcohol withdrawal syndrome, asthma, ADHD, B12 deficiency, bipolar disorder, bronchitis, carpal tunnel syndrome, encephalopathy, esophageal reflux, hepatitis C, hypokalemia, obsessive-compulsive disorder, sacroiliitis, vitamin D deficiency, presents to the emergency room today for complaint of shortness of breath.  Patient states with past 2 days she has had persistently worsening shortness of breath is worse with exertion and improves at rest.  Patient complains of chest tightness and a harsh moist cough.  Patient states she was seen by primary care provider yesterday and received course of prednisone.  Patient states she took a dose yesterday and today however symptoms have persisted.  Patient denies any abdominal pain, nausea, ongoing diarrhea or constipation, fever, shaking, chills, recent travel or contact with known ill dividual's.              Patient History   No past medical history on file.  No past surgical history on file.  No family history on file.  Social History     Tobacco Use    Smoking status: Not on file    Smokeless tobacco: Not on file   Substance Use Topics    Alcohol use: Not on file    Drug use: Not on file       Physical Exam   ED Triage Vitals [08/28/24 2001]   Temperature Heart Rate Respirations BP   36.7 °C (98.1 °F) 92 (!) 22 162/83      Pulse Ox Temp Source Heart Rate Source Patient Position   94 % Temporal Monitor --      BP Location FiO2 (%)     -- --       Physical Exam  Vitals and nursing note reviewed. Exam conducted with a chaperone present.   Constitutional:       General: She is not in acute  distress.     Appearance: Normal appearance. She is not ill-appearing, toxic-appearing or diaphoretic.      Interventions: She is not intubated.  HENT:      Head: Normocephalic.      Nose: Nose normal.      Mouth/Throat:      Mouth: Mucous membranes are moist.      Pharynx: No oropharyngeal exudate or posterior oropharyngeal erythema.   Eyes:      General:         Right eye: No discharge.         Left eye: No discharge.      Extraocular Movements: Extraocular movements intact.      Pupils: Pupils are equal, round, and reactive to light.   Neck:      Thyroid: No thyromegaly.      Vascular: No carotid bruit, hepatojugular reflux or JVD.   Cardiovascular:      Rate and Rhythm: Normal rate and regular rhythm. No extrasystoles are present.     Pulses: Normal pulses. No decreased pulses.      Heart sounds: Normal heart sounds. Heart sounds not distant. No murmur heard.     No friction rub. No gallop.   Pulmonary:      Effort: Pulmonary effort is normal. No tachypnea, bradypnea, accessory muscle usage, prolonged expiration, respiratory distress or retractions. She is not intubated.      Breath sounds: No stridor, decreased air movement or transmitted upper airway sounds. Examination of the right-upper field reveals wheezing. Examination of the left-upper field reveals wheezing. Examination of the right-middle field reveals rhonchi. Examination of the left-middle field reveals rhonchi. Examination of the right-lower field reveals rhonchi. Examination of the left-lower field reveals rhonchi. Wheezing and rhonchi present. No decreased breath sounds or rales.   Chest:      Chest wall: No tenderness.   Abdominal:      General: Abdomen is flat. Bowel sounds are normal.      Palpations: Abdomen is soft.   Musculoskeletal:         General: Normal range of motion.      Cervical back: Normal range of motion and neck supple. No rigidity or tenderness.   Lymphadenopathy:      Cervical: No cervical adenopathy.   Skin:     General: Skin  is warm and dry.      Capillary Refill: Capillary refill takes less than 2 seconds.   Neurological:      General: No focal deficit present.      Mental Status: She is alert and oriented to person, place, and time.           ED Course & MDM   ED Course as of 08/29/24 0110   Wed Aug 28, 2024   2116 EKG interpreted by myself reveals normal sinus rhythm with a rate of 70 bpm with no ST elevation however there is T wave inversions in leads V1, there is no previous EKG for comparison. [EC]   2117 Chest x-ray reveals  IMPRESSION:  1. No acute cardiopulmonary process.   [EC]      ED Course User Index  [EC] Nicholas Malik, APRN-CNP         Diagnoses as of 08/29/24 0110   COPD exacerbation (Multi)   Shortness of breath                 No data recorded     Julieth Coma Scale Score: 15 (08/28/24 2145 : Kaykay Tomas RN)                           Medical Decision Making  Given patient's complaint presentation a thorough exam was performed.  Patient is tachypneic initially upon arrival and complaining of shortness of breath, lung sounds auscultated reveal rhonchorous lung sounds diffusely across the middle and lower lobes with slight amount of wheezing auscultated about the upper fields, cardiac sounds auscultated are regular, denies chest pain and believes chest tightness she may be related to COPD.  Patient denies any home O2 use, or swelling in the arms or legs. Lab work was ordered including chest x-ray, EKG, DuoNeb breathing treatment, morphine.  Patient states she did take a dose of prednisone earlier this morning and Solu-Medrol was not administered in the emergency room.  Lab revealed several irregularities without any critical lab values, no show troponin feels a value of 6, repeat troponin also reveals a value of 6, chest x-ray is interpreted by radiologist reveals no acute cardiopulmonary process.  Reevaluation of patient reveals some improvement his lung sounds have cleared however slight amount of wheezing auscultated  throughout the upper lobes remains.  Discussion was had with patient regards to discharge home versus admission for COPD exacerbation, shortness of breath and failed outpatient therapy.  Patient states she would prefer to be admitted for observation.  I consulted Dr. Bustamante in regards to admission.  Dr. Bustamante is agreed admit patient to his service and will manage inpatient care.    ALDO Hunter     Portions of this note were generated using digital voice recognition software, and may contain grammatical errors      Procedure  Procedures     ALDO Hunter  08/29/24 0110

## 2024-08-29 NOTE — H&P
Patient Name: Dion Tyson   YOB: 1960    Subjective:    Dion Tyson is a 64 y.o. female who presents to [facility] with complaints of shortness of breath and cough.  Symptoms started 2 days ago, she is white and saw her PCP yesterday and was started on prednisone.  Her breathing is not improved, she continues to cough and this makes it very hard for her to breathe.  Denies any fevers or chills.  Patient admits to still smoking about a pack a day, she has been doing this for over 50 years.  In the ED she has increased work of breathing, however she is not requiring any oxygen.  Chest x-ray negative for infiltrates or effusions.    A 10 point ROS was completed and is negative expect as stated in HPI.     Past Medical History:  COPD  Tobacco abuse  H/o alcohol abuse  Bipolar disorder  PTSD  OCD  ADHD    Past Surgical History:  Bone biopsy    Social History: Tobacco - Current User, Alcohol - history of alcohol abuse quit 6 months ago , Recreational Drugs - none    Family History: alcohol and drug issues in family, psychiatric history in family     Objective:    /64   Pulse 84   Temp 36.7 °C (98.1 °F) (Temporal)   Resp 20   Ht 1.524 m (5')   Wt 59.4 kg (131 lb)   SpO2 95%   BMI 25.58 kg/m²     Physical Exam:    GEN: Reliable historian. Well developed, not in acute distress.   HEENT: Normocephalic and atraumatic.  Mucous membranes moist.  Clear sclera, PERRL, EOMI.  CARDIO: Regular rate and rhythm.  No murmurs, rubs, or gallops. No LE edema  RESP: diffuse bilateral rhonchi and wheezing  ABD: +BS x4, soft, non-tender. Not distended. No rebound or guarding.  MSK: Grossly normal inspection. No joint swelling or obvious deformities. ROM intact  NEURO: A&O X 3. CN II-XII are grossly intact. No focal deficits.   SKIN: Warm and dry, no lesions, no rashes.  PSYCH: anxious     Scheduled medications  [START ON 8/29/2024]  atorvastatin, 40 mg, oral, Daily  [START ON 8/29/2024] azithromycin, 250 mg, oral, q24h FRACSICO  azithromycin, 500 mg, intravenous, Once  [START ON 8/29/2024] busPIRone, 20 mg, oral, Daily  enoxaparin, 40 mg, subcutaneous, q24h  gabapentin, 300 mg, oral, TID  ibuprofen, 800 mg, oral, 4x daily  [START ON 8/29/2024] ipratropium-albuteroL, 3 mL, nebulization, q6h  [START ON 8/29/2024] lamoTRIgine, 300 mg, oral, Daily  [START ON 8/29/2024] methylPREDNISolone sodium succinate (PF), 40 mg, intravenous, q8h  [START ON 8/29/2024] nicotine, 1 patch, transdermal, Daily  [START ON 8/29/2024] pantoprazole, 40 mg, oral, Daily before breakfast  [START ON 8/29/2024] QUEtiapine, 300 mg, oral, Daily  [START ON 8/29/2024] rOPINIRole XL, 2 mg, oral, Daily  [START ON 8/29/2024] topiramate, 100 mg, oral, Daily  [START ON 8/29/2024] traZODone, 200 mg, oral, Daily      Continuous medications     PRN medications  PRN medications: acetaminophen, dextromethorphan-guaifenesin, ondansetron     Assessment and Plan:    Dion Tyson is a 64 y.o. female who presents to [facility] with complaints of shortness of breath and cough.    # Acute COPD exacerbation  # Tobacco abuse with nicotine withdrawal  # Bipolar disorder  # PTSD, OCD, ADHD  # h/o alcohol abuse quit 6 months ago    She was given DuoNeb breathing treatments in the ED without any improvement.  We will admit patient and treat for acute COPD exacerbation.  Ordered scheduled Skip Ortizitussin DM.  Ordered Solu-Medrol 40 mg every 8, azithromycin.  Patient asking for nicotine patch which has been ordered.  Discussed with patient importance of smoking cessation, she states she understands but it has been difficult for her to quit.  Continue her BuSpar, gabapentin, lamotrigine, Seroquel, Topamax, trazodone, ropinirole.  Monitor on telemetry and monitor her QTc.  Her mood seem stable at the moment.      Alok Bustamante DO  Senior Attending Physician  Hospital  Medicine  Clinical Instructor

## 2025-08-22 ENCOUNTER — APPOINTMENT (OUTPATIENT)
Dept: NEUROSURGERY | Facility: CLINIC | Age: 65
End: 2025-08-22
Payer: COMMERCIAL

## 2025-09-19 ENCOUNTER — APPOINTMENT (OUTPATIENT)
Dept: NEUROSURGERY | Facility: CLINIC | Age: 65
End: 2025-09-19
Payer: COMMERCIAL